# Patient Record
Sex: FEMALE | Race: WHITE
[De-identification: names, ages, dates, MRNs, and addresses within clinical notes are randomized per-mention and may not be internally consistent; named-entity substitution may affect disease eponyms.]

---

## 2020-08-30 ENCOUNTER — HOSPITAL ENCOUNTER (EMERGENCY)
Dept: HOSPITAL 26 - MED | Age: 40
Discharge: HOME | End: 2020-08-30
Payer: MEDICAID

## 2020-08-30 VITALS — HEIGHT: 60 IN | BODY MASS INDEX: 33.77 KG/M2 | WEIGHT: 172 LBS

## 2020-08-30 VITALS — SYSTOLIC BLOOD PRESSURE: 136 MMHG | DIASTOLIC BLOOD PRESSURE: 88 MMHG

## 2020-08-30 VITALS — SYSTOLIC BLOOD PRESSURE: 140 MMHG | DIASTOLIC BLOOD PRESSURE: 91 MMHG
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
## 2020-08-30 DIAGNOSIS — M25.562: Primary | ICD-10-CM

## 2020-08-30 DIAGNOSIS — M79.622: ICD-10-CM
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
## 2020-08-30 PROCEDURE — 73562 X-RAY EXAM OF KNEE 3: CPT

## 2020-08-30 PROCEDURE — 90715 TDAP VACCINE 7 YRS/> IM: CPT

## 2020-08-30 PROCEDURE — 90471 IMMUNIZATION ADMIN: CPT

## 2020-08-30 PROCEDURE — 93971 EXTREMITY STUDY: CPT

## 2020-08-30 PROCEDURE — 99284 EMERGENCY DEPT VISIT MOD MDM: CPT

## 2020-08-30 NOTE — NUR
41 y/o female from home c/o left calf pain s/p fall yesterday. Pt states she 
got out of truck and placed all weight on left leg causing fall. Abrasion to 
left knee noted. Minor swelling to calf noted, no discoloration. 8/10 pain, 
worse with ambulation. Referred from Urgent Care to rule out DVT. Able to 
ambulate with limping gait. Positioned for comfort, VSS 



medhx: Migraines

## 2020-08-30 NOTE — NUR
-------------------------------------------------------------------------------

           *** Note undone in EDM - 08/30/20 at 1419 by [email or web address] ***            

-------------------------------------------------------------------------------

39 y/o male from home c/o left calf pain s/p fall yesterday. Pt states she got 
out of truck and placed all weight on left leg causing fall. Abrasion to left 
knee noted. Minor swelling to calf noted, no discoloration. 8/10 pain, worse 
with ambulation. Referred from Urgent Care to rule out DVT. Able to ambulate 
with limping gait. Positioned for comfort, VSS 



medhx: Migraines

## 2020-08-30 NOTE — NUR
Patient discharged with v/s stable. Written and verbal after care instructions 
given and explained. 

Patient alert, oriented and verbalized understanding of instructions. 
Ambulatory with steady gait. All questions addressed prior to discharge. ID 
band removed. Patient advised to follow up with PMD. Rx of Motrin 600mg given. 
Patient educated on indication of medication including possible reaction and 
side effects. Opportunity to ask questions provided and answered.